# Patient Record
Sex: FEMALE | Race: OTHER | URBAN - METROPOLITAN AREA
[De-identification: names, ages, dates, MRNs, and addresses within clinical notes are randomized per-mention and may not be internally consistent; named-entity substitution may affect disease eponyms.]

---

## 2017-01-04 ENCOUNTER — HOSPITAL ENCOUNTER (OUTPATIENT)
Age: 47
Discharge: HOME | End: 2017-01-04
Attending: EMERGENCY MEDICINE

## 2017-01-04 VITALS
OXYGEN SATURATION: 97 % | TEMPERATURE: 98 F | RESPIRATION RATE: 20 BRPM | SYSTOLIC BLOOD PRESSURE: 114 MMHG | DIASTOLIC BLOOD PRESSURE: 75 MMHG | HEART RATE: 81 BPM

## 2017-01-04 DIAGNOSIS — J02.9 ACUTE VIRAL PHARYNGITIS: Primary | ICD-10-CM

## 2017-01-04 LAB — S PYO AG THROAT QL: NEGATIVE

## 2017-01-04 PROCEDURE — 87430 STREP A AG IA: CPT

## 2017-01-04 PROCEDURE — 99202 OFFICE O/P NEW SF 15 MIN: CPT

## 2017-01-04 NOTE — ED PROVIDER NOTES
Patient Seen in: [facility] In [address] [place] [address]    History   Patient presents with:  Sore Throat    Stated Complaint: SORE Karie Speak    HPI    The patient is a 78-year-old female with no significant past medical history presents no - No data to display    MDM   Pulse ox is 97% on room air, normal        Disposition and Plan     Clinical Impression:  Acute viral pharyngitis  (primary encounter diagnosis)    Disposition:  Discharge    Follow-up:  Jason Larios MD  [address] [place] [address]

## (undated) NOTE — ED AVS SNAPSHOT
[facility]mediate Care in [place] [address].  [address] \[facility]\""    Phone:  [phone]    Fax:  [phone] [facility]   MRN: [id number]    Department:  [facility] in [place] [address] related to the care you received in our Immediate Care. Please call our [address] at [phone]. Your Immediate Care team is here to serve you. You are our top priority. You were examined and treated today on an urgent basis only.   This was not I certified that I have received a copy of the aftercare instructions; that these instructions have been explained to me; all questions pertaining to these instructions have been answered in a satisfactory manner.         [address] your Zip Code and Date of Birth to complete the sign-up process. If you do not sign up before the expiration date, you must request a new code.     Your unique Partender Access Code: [email or web address]  Expires: 3/5/2017  5:27 PM    If you have questions, you can ca